# Patient Record
Sex: FEMALE | Race: WHITE | NOT HISPANIC OR LATINO | Employment: FULL TIME | URBAN - METROPOLITAN AREA
[De-identification: names, ages, dates, MRNs, and addresses within clinical notes are randomized per-mention and may not be internally consistent; named-entity substitution may affect disease eponyms.]

---

## 2019-04-06 ENCOUNTER — HOSPITAL ENCOUNTER (EMERGENCY)
Facility: HOSPITAL | Age: 29
Discharge: HOME | End: 2019-04-06
Attending: EMERGENCY MEDICINE
Payer: COMMERCIAL

## 2019-04-06 VITALS
TEMPERATURE: 98 F | RESPIRATION RATE: 18 BRPM | BODY MASS INDEX: 27.32 KG/M2 | DIASTOLIC BLOOD PRESSURE: 67 MMHG | SYSTOLIC BLOOD PRESSURE: 130 MMHG | WEIGHT: 170 LBS | HEIGHT: 66 IN | OXYGEN SATURATION: 98 % | HEART RATE: 114 BPM

## 2019-04-06 DIAGNOSIS — S93.402A SPRAIN OF LEFT ANKLE, UNSPECIFIED LIGAMENT, INITIAL ENCOUNTER: Primary | ICD-10-CM

## 2019-04-06 PROCEDURE — 99284 PR EMERGENCY DEPT VISIT,LEVEL IV: ICD-10-PCS | Mod: ,,, | Performed by: PHYSICIAN ASSISTANT

## 2019-04-06 PROCEDURE — 99284 EMERGENCY DEPT VISIT MOD MDM: CPT | Mod: 25

## 2019-04-06 PROCEDURE — 25000003 PHARM REV CODE 250: Performed by: PHYSICIAN ASSISTANT

## 2019-04-06 PROCEDURE — 96372 THER/PROPH/DIAG INJ SC/IM: CPT

## 2019-04-06 PROCEDURE — 63600175 PHARM REV CODE 636 W HCPCS: Performed by: PHYSICIAN ASSISTANT

## 2019-04-06 PROCEDURE — 99284 EMERGENCY DEPT VISIT MOD MDM: CPT | Mod: ,,, | Performed by: PHYSICIAN ASSISTANT

## 2019-04-06 RX ORDER — ACETAMINOPHEN 500 MG
1000 TABLET ORAL
Status: COMPLETED | OUTPATIENT
Start: 2019-04-06 | End: 2019-04-06

## 2019-04-06 RX ORDER — KETOROLAC TROMETHAMINE 30 MG/ML
15 INJECTION, SOLUTION INTRAMUSCULAR; INTRAVENOUS
Status: COMPLETED | OUTPATIENT
Start: 2019-04-06 | End: 2019-04-06

## 2019-04-06 RX ORDER — NAPROXEN 500 MG/1
500 TABLET ORAL 2 TIMES DAILY WITH MEALS
Qty: 30 TABLET | Refills: 0 | Status: SHIPPED | OUTPATIENT
Start: 2019-04-06

## 2019-04-06 RX ADMIN — ACETAMINOPHEN 1000 MG: 500 TABLET ORAL at 08:04

## 2019-04-06 RX ADMIN — KETOROLAC TROMETHAMINE 15 MG: 30 INJECTION, SOLUTION INTRAMUSCULAR at 08:04

## 2019-04-07 NOTE — ED TRIAGE NOTES
"Pt was dancing and "rolled" her ankle.  Hobbled to the bed at home.  Denies falling, denies any other pain.  No obvious deformity, cap refill <3, strong pedal pulse.  No discoloration.  "

## 2019-04-07 NOTE — ED PROVIDER NOTES
Encounter Date: 4/6/2019       History     Chief Complaint   Patient presents with    Foot Pain     reports twisting ankle to left foot tonight, unable to bear weight on foot     28-year-old female with no known past medical history presents for pain and swelling to the left ankle after twisting it while dancing 1 hr PTA.  She reports difficulty bearing weight afterwards.  Pain is worse with movement, denies any palliating factors, has not attempted any OTC medications.  Denies pain to any other joints, numbness/tingling/weakness.  Denies LOC or syncope.        Review of patient's allergies indicates:  No Known Allergies  History reviewed. No pertinent past medical history.  History reviewed. No pertinent surgical history.  History reviewed. No pertinent family history.  Social History     Tobacco Use    Smoking status: Current Every Day Smoker     Packs/day: 0.50     Types: Cigarettes    Smokeless tobacco: Never Used   Substance Use Topics    Alcohol use: Not on file    Drug use: Not Currently     Review of Systems   Constitutional: Negative for fatigue and fever.   Respiratory: Negative for cough and shortness of breath.    Cardiovascular: Negative for chest pain and palpitations.   Gastrointestinal: Negative for abdominal pain, diarrhea, nausea and vomiting.   Genitourinary: Negative for dysuria and hematuria.   Musculoskeletal: Positive for arthralgias, gait problem and joint swelling. Negative for myalgias, neck pain and neck stiffness.   Skin: Negative for pallor and wound.   Allergic/Immunologic: Negative for environmental allergies and immunocompromised state.   Neurological: Negative for weakness and numbness.   Hematological: Negative for adenopathy. Does not bruise/bleed easily.       Physical Exam     Initial Vitals [04/06/19 1955]   BP Pulse Resp Temp SpO2   130/67 (!) 114 18 98.4 °F (36.9 °C) 98 %      MAP       --         Physical Exam    Nursing note and vitals reviewed.  Constitutional: She  appears well-developed and well-nourished. She is not diaphoretic. No distress.   Family member at bedside   HENT:   Head: Normocephalic and atraumatic.   Neck: Normal range of motion. Neck supple.   Cardiovascular: Regular rhythm, normal heart sounds and intact distal pulses. Exam reveals no gallop and no friction rub.    No murmur heard.  Tachycardic   Pulmonary/Chest: Breath sounds normal.   Musculoskeletal:        Right knee: Normal.        Left knee: Normal.        Right ankle: Normal.        Left ankle: She exhibits swelling. She exhibits normal range of motion, no ecchymosis, no deformity, no laceration and normal pulse. Tenderness. Head of 5th metatarsal tenderness found. No lateral malleolus, no medial malleolus, no AITFL, no CF ligament, no posterior TFL and no proximal fibula tenderness found. Achilles tendon exhibits no pain, no defect and normal Prince's test results.        Right lower leg: Normal.        Left lower leg: Normal.        Right foot: Normal.        Left foot: There is tenderness and swelling. There is normal range of motion, no bony tenderness, normal capillary refill, no crepitus, no deformity and no laceration.   Neurological: She is alert and oriented to person, place, and time. She has normal strength. No sensory deficit.   Skin: Skin is warm and dry.   Psychiatric: She has a normal mood and affect.         ED Course   Procedures  Labs Reviewed - No data to display       Imaging Results          X-Ray Foot Complete Left (Final result)  Result time 04/06/19 21:29:55    Final result by Figueroa Carreon MD (04/06/19 21:29:55)                 Impression:      Mild soft tissue swelling about the ankle.  No acute bony abnormality.    Nonspecific round metallic foreign body adjacent to the 1st proximal phalanx.      Electronically signed by: Figueroa Carreon MD  Date:    04/06/2019  Time:    21:29             Narrative:    EXAMINATION:  XR ANKLE COMPLETE 3 VIEW LEFT; XR FOOT COMPLETE 3  VIEW LEFT    CLINICAL HISTORY:  Pain in unspecified ankle and joints of unspecified foot; Pain in unspecified foot.    TECHNIQUE:  Three views left ankle and three views left foot.    COMPARISON:  None.    FINDINGS:  No evidence of acute fracture or dislocation.  Mild soft tissue edema noted about the ankle.  There is a 4 mm well-circumscribed round radiopaque foreign body noted at the lateral aspect of the 1st proximal phalanx.                               X-Ray Ankle Complete Left (Final result)  Result time 04/06/19 21:29:55    Final result by Figueroa Carreon MD (04/06/19 21:29:55)                 Impression:      Mild soft tissue swelling about the ankle.  No acute bony abnormality.    Nonspecific round metallic foreign body adjacent to the 1st proximal phalanx.      Electronically signed by: Figueroa Carreon MD  Date:    04/06/2019  Time:    21:29             Narrative:    EXAMINATION:  XR ANKLE COMPLETE 3 VIEW LEFT; XR FOOT COMPLETE 3 VIEW LEFT    CLINICAL HISTORY:  Pain in unspecified ankle and joints of unspecified foot; Pain in unspecified foot.    TECHNIQUE:  Three views left ankle and three views left foot.    COMPARISON:  None.    FINDINGS:  No evidence of acute fracture or dislocation.  Mild soft tissue edema noted about the ankle.  There is a 4 mm well-circumscribed round radiopaque foreign body noted at the lateral aspect of the 1st proximal phalanx.                                 Medical Decision Making:   History:   Old Medical Records: I decided to obtain old medical records.  Clinical Tests:   Radiological Study: Ordered and Reviewed       APC / Resident Notes:   28-year-old female presenting for pain and swelling of the left ankle after twisting it while dancing.  She is mildly tachycardic with heart rate of 114, vitals otherwise normal. Left ankle is swollen, with bony tenderness over the head of the 5th metatarsal.  No tenderness of palpation bilateral malleoli.  Neurovascularly intact.  Differential diagnosis includes ankle sprain, metatarsal fracture, ankle fracture.  Will give Toradol, do x-rays and reassess.    X-rays without evidence of fracture.  Will place an Ace wrap and give crutches.  Will discharge with naproxen and instructions to RICE the ankle. Stressed the importance of follow-up, strict ED return precautions given.  Patient voiced understanding and is comfortable with discharge. I discussed this patient with my supervising physician.    Pat Mars PA-C           Attending Attestation:     Physician Attestation Statement for NP/PA:   I discussed this assessment and plan of this patient with the NP/PA, but I did not personally examine the patient. The face to face encounter was performed by the NP/PA.                     Clinical Impression:       ICD-10-CM ICD-9-CM   1. Sprain of left ankle, unspecified ligament, initial encounter S93.402A 845.00         Disposition:   Disposition: Discharged  Condition: Stable                        Pat Mars PA-C  04/06/19 [id number]       Rosa Buck MD  04/15/19 0623

## 2019-08-28 ENCOUNTER — HOSPITAL ENCOUNTER (EMERGENCY)
Facility: HOSPITAL | Age: 29
Discharge: HOME | End: 2019-08-28
Attending: EMERGENCY MEDICINE
Payer: COMMERCIAL

## 2019-08-28 VITALS
BODY MASS INDEX: 30.22 KG/M2 | OXYGEN SATURATION: 99 % | SYSTOLIC BLOOD PRESSURE: 125 MMHG | DIASTOLIC BLOOD PRESSURE: 81 MMHG | RESPIRATION RATE: 12 BRPM | HEIGHT: 66 IN | TEMPERATURE: 98 F | WEIGHT: 188 LBS | HEART RATE: 81 BPM
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
## 2019-08-28 PROCEDURE — 99283 EMERGENCY DEPT VISIT LOW MDM: CPT | Mod: ,,, | Performed by: EMERGENCY MEDICINE

## 2019-08-28 PROCEDURE — 99283 PR EMERGENCY DEPT VISIT,LEVEL III: ICD-10-PCS | Mod: ,,, | Performed by: EMERGENCY MEDICINE

## 2019-08-28 PROCEDURE — 99283 EMERGENCY DEPT VISIT LOW MDM: CPT

## 2019-08-28 RX ORDER — SULFAMETHOXAZOLE AND TRIMETHOPRIM 800; 160 MG/1; MG/1
1 TABLET ORAL 2 TIMES DAILY
Qty: 14 TABLET | Refills: 0 | Status: SHIPPED | OUTPATIENT
Start: 2019-08-28 | End: 2019-09-04

## 2019-08-28 RX ORDER — LEVOTHYROXINE SODIUM 25 UG/1
25 TABLET ORAL DAILY
COMMUNITY

## 2019-08-28 RX ORDER — SERTRALINE HYDROCHLORIDE 50 MG/1
150 TABLET, FILM COATED ORAL DAILY
COMMUNITY

## 2019-08-28 NOTE — ED NOTES
LOC: The patient is awake, alert and aware of environment with an appropriate affect, the patient is oriented x 3 and speaking appropriately.  APPEARANCE: Patient resting comfortably and in no acute distress, patient is clean and well groomed.  SKIN: The skin is warm and dry, color consistent with ethnicity. Small area to left calf appears to be an insect bite  MUSKULOSKELETAL: Patient moving all extremities well, no obvious swelling or deformities noted.  RESPIRATORY: Airway is open and patent, respirations are spontaneous, patient has a normal effort and rate, no accessory muscle use noted.   NEUROLOGIC: Eyes open spontaneously, behavior appropriate to situation, follows commands

## 2019-08-28 NOTE — ED PROVIDER NOTES
"Encounter Date: 8/28/2019    SCRIBE #1 NOTE: I, Ania Garcia, am scribing for, and in the presence of,  Dr. Miguel Leonardo. I have scribed the entire note.       History     Chief Complaint   Patient presents with    Insect Bite     possible insect bite to posterior left calf since Sunday with increased pain     9:22 AM    Ms. Sandoval is a 29 year old female with a past medical history of thyroid disease with complaints of an insect bite. The patient reports that she noticed the instect bite on 8/25/19 on the back of her left calf. She states that the bite is itchy and red. The patient also states that she had difficulty "putting pressure" on her left leg. She reports occasional alcohol use and cigarette use. The patient denies allergies to medications, pertinent family history, diabetes, fevers, and shortness of breath.     The history is provided by the patient and medical records.     Review of patient's allergies indicates:  No Known Allergies  Past Medical History:   Diagnosis Date    Thyroid disease      History reviewed. No pertinent surgical history.  Family History   Problem Relation Age of Onset    No Known Problems Mother     No Known Problems Father      Social History     Tobacco Use    Smoking status: Current Every Day Smoker     Packs/day: 0.50     Types: Cigarettes    Smokeless tobacco: Never Used   Substance Use Topics    Alcohol use: Yes    Drug use: Not Currently     Review of Systems   Constitutional: Negative for chills and fever.   HENT: Negative for congestion and sore throat.    Eyes: Negative for visual disturbance.   Respiratory: Negative for cough and shortness of breath.    Cardiovascular: Negative for chest pain.   Gastrointestinal: Negative for nausea and vomiting.   Genitourinary: Negative for dysuria and hematuria.   Musculoskeletal: Negative for back pain.   Skin: Negative for rash.        Insect bite on back of left calf.    Neurological: Negative for weakness and numbness. " "  Hematological: Does not bruise/bleed easily.       Physical Exam     Initial Vitals [08/28/19 0922]   BP Pulse Resp Temp SpO2   125/81 81 12 97.9 °F (36.6 °C) 99 %      MAP       --         Physical Exam    Nursing note and vitals reviewed.  Constitutional: She appears well-developed and well-nourished. She is not diaphoretic. No distress.   HENT:   Head: Normocephalic and atraumatic.   Nose: Nose normal.   Eyes: EOM are normal. Pupils are equal, round, and reactive to light.   Neck: Normal range of motion. Neck supple.   Cardiovascular: Normal rate and regular rhythm.   Pulmonary/Chest: Breath sounds normal. No respiratory distress. She has no wheezes. She has no rhonchi. She has no rales. She exhibits no tenderness.   Abdominal: Soft. Bowel sounds are normal. She exhibits no distension. There is no tenderness.   Musculoskeletal: Normal range of motion.   Neurological: She is alert and oriented to person, place, and time. She has normal strength.   Skin: Skin is warm and dry. No rash noted.   2cm insect bite mild induration with erythema. No fluctuance or indication of abscess or indication for I&D.    Psychiatric: She has a normal mood and affect. Her behavior is normal. Thought content normal.       Vitals:    08/28/19 0922   BP: 125/81   Pulse: 81   Resp: 12   Temp: 97.9 °F (36.6 °C)   SpO2: 99%   Weight: 85.3 kg (188 lb)   Height: 5' 6" (1.676 m)         ED Course   Procedures  Labs Reviewed - No data to display       Imaging Results    None          Medical Decision Making:   History:   Old Medical Records: I decided to obtain old medical records.  ED Management:  9:23 AM  We will discharge with antibiotics and follow up precautions. There is no indication for I&D at this time. The patient is comfortale with this plan. I spoke with the patient about using topical itching medications and to aboid steroid medicaitons. The patient asked for a work excuse which I will provide.             Scribe Attestation: "   Scribe #1: I performed the above scribed service and the documentation accurately describes the services I performed. I attest to the accuracy of the note.               Clinical Impression:       ICD-10-CM ICD-9-CM   1. Insect bite, initial encounter W57.XXXA 919.4     E906.4         Disposition:   Disposition: Discharged  Condition: Stable                        Miguel Leonardo MD  08/28/19 1109

## 2019-11-05 ENCOUNTER — HOSPITAL ENCOUNTER (EMERGENCY)
Facility: HOSPITAL | Age: 29
Discharge: HOME | End: 2019-11-05
Attending: EMERGENCY MEDICINE
Payer: COMMERCIAL

## 2019-11-05 VITALS
RESPIRATION RATE: 18 BRPM | WEIGHT: 189 LBS | DIASTOLIC BLOOD PRESSURE: 83 MMHG | BODY MASS INDEX: 30.37 KG/M2 | OXYGEN SATURATION: 98 % | HEIGHT: 66 IN | TEMPERATURE: 98 F | SYSTOLIC BLOOD PRESSURE: 132 MMHG | HEART RATE: 84 BPM

## 2019-11-05 DIAGNOSIS — L03.90 WOUND CELLULITIS: Primary | ICD-10-CM

## 2019-11-05 PROCEDURE — 99283 EMERGENCY DEPT VISIT LOW MDM: CPT

## 2019-11-05 PROCEDURE — 99284 PR EMERGENCY DEPT VISIT,LEVEL IV: ICD-10-PCS | Mod: ,,, | Performed by: EMERGENCY MEDICINE

## 2019-11-05 PROCEDURE — 99284 EMERGENCY DEPT VISIT MOD MDM: CPT | Mod: ,,, | Performed by: EMERGENCY MEDICINE

## 2019-11-05 RX ORDER — SULFAMETHOXAZOLE AND TRIMETHOPRIM 800; 160 MG/1; MG/1
1 TABLET ORAL 2 TIMES DAILY
Qty: 14 TABLET | Refills: 0 | Status: SHIPPED | OUTPATIENT
Start: 2019-11-05 | End: 2019-11-12

## 2019-11-06 NOTE — ED PROVIDER NOTES
Encounter Date: 11/5/2019       History     Chief Complaint   Patient presents with    Insect Bite     above L knee, noticed bite yest     Patient is a 29 year old female that presents to the ED with wound to L knee. States she noted it yesterday. No injury, trauma, or known insect bite. Complains of pain to the area that is a 4/10. No fever. Took ibuprofen at 1330.        Review of patient's allergies indicates:  No Known Allergies  Past Medical History:   Diagnosis Date    Thyroid disease      History reviewed. No pertinent surgical history.  Family History   Problem Relation Age of Onset    No Known Problems Mother     No Known Problems Father      Social History     Tobacco Use    Smoking status: Current Every Day Smoker     Packs/day: 0.50     Types: Cigarettes    Smokeless tobacco: Never Used   Substance Use Topics    Alcohol use: Yes     Comment: occas    Drug use: Yes     Types: Marijuana     Review of Systems   Constitutional: Negative for chills and fever.   HENT: Negative for sore throat.    Respiratory: Negative for shortness of breath.    Cardiovascular: Negative for chest pain.   Gastrointestinal: Negative for nausea.   Genitourinary: Negative for dysuria.   Musculoskeletal: Negative for back pain.   Skin: Positive for color change and wound. Negative for rash.   Neurological: Negative for weakness.   Hematological: Does not bruise/bleed easily.       Physical Exam     Initial Vitals [11/05/19 1834]   BP Pulse Resp Temp SpO2   132/83 84 18 98 °F (36.7 °C) 98 %      MAP       --         Physical Exam    Vitals reviewed.  Constitutional: She appears well-developed and well-nourished. She is not diaphoretic. No distress.   HENT:   Head: Normocephalic and atraumatic.   Nose: Nose normal.   Eyes: Conjunctivae and EOM are normal.   Neck: Normal range of motion.   Pulmonary/Chest: No respiratory distress.   Abdominal: She exhibits no distension.   Musculoskeletal: Normal range of motion.        Left  knee: She exhibits normal range of motion, no swelling, no effusion and no bony tenderness.   3 x 3 cm erythema noted to L knee. Scab noted.    Neurological: She is alert and oriented to person, place, and time. She has normal strength. No sensory deficit.   Skin: Skin is warm and dry. There is erythema. No pallor.   Psychiatric: She has a normal mood and affect. Thought content normal.         ED Course   Procedures  Labs Reviewed - No data to display       Imaging Results    None          Medical Decision Making:   History:   Old Medical Records: I decided to obtain old medical records.  Initial Assessment:   Patient is a 29 year old female that presents to the ED with wound to L knee.   Differential Diagnosis:   Includes but is not limited to but bite, local reaction, cellulitis, abscess. L knee ROM and strength intact. No joint involvement.   ED Management:  Localized erythema noted to L knee. Scab was unroofed. Scant pus expressed from wound.     I will treat patient for localized cellulitis from possible bug bite. Proper wound care instructions given. Rx TMP SMZ. Advise topical application of abx ointment. States that she has neosporin at home. Follow up with PCP. Return to ED precautions given.                                  Clinical Impression:       ICD-10-CM ICD-9-CM   1. Wound cellulitis L03.90 682.9         Disposition:   Disposition: Discharged  Condition: Stable                     Diana Mitchell PA-C  11/06/19 1200

## 2019-11-06 NOTE — ED TRIAGE NOTES
Patient states small red round area to left leg near knee, onset yesterday, painful to touch. Denies fevers. Ibuprofen 400mg at 1330

## 2019-11-06 NOTE — PROVIDER PROGRESS NOTES - EMERGENCY DEPT.
"Encounter Date: 11/5/2019    ED Physician Progress Notes           Medical screening exam completed.  I have conducted a focused provider triage encounter, findings are as follows:    Brief history of present illness:  Abscess since yesterday - worried about spider bite.  PCP told pt to come to ED    Vitals:    11/05/19 1834   BP: 132/83   Pulse: 84   Resp: 18   Temp: 98 °F (36.7 °C)   TempSrc: Oral   SpO2: 98%   Weight: 85.7 kg (189 lb)   Height: 5' 6" (1.676 m)       Pertinent physical exam:          Brief workup plan:  Pt will be seen in intake.  Probably better c de-gwendolyn, doubt need for I&D    Preliminary workup initiated; this workup will be continued and followed by the physician or advanced practice provider that is assigned to the patient when roomed.  "

## 2019-11-06 NOTE — DISCHARGE INSTRUCTIONS
Apply triple antibiotic ointment 2 times a day which is every 12 hr to area.  Keep wound clean and dry by changing dressing frequently.  Start taking oral antibiotic (Bactrim) 2 times a day which is every 12 hr.  Follow up with her primary care physician if your symptoms do not improve or worsen in 3 days or return to the emergency department.  No future appointments.    Our goal in the emergency department is to always give you outstanding care and exceptional service. You may receive a survey by mail or e-mail in the next week regarding your experience in our ED. We would greatly appreciate your completing and returning the survey. Your feedback provides us with a way to recognize our staff who give very good care and it helps us learn how to improve when your experience was below our aspiration of excellence.

## 2019-11-06 NOTE — ED NOTES
Patient identifiers verified and correct for Ms Bhatt  C/C: Small red area to left knee SEE NN  APPEARANCE: awake and alert in NAD.  SKIN: warm, dry,small red raised area to knee  MUSCULOSKELETAL: Patient moving all extremities spontaneously, no obvious swelling or deformities noted. Ambulates independently.  RESPIRATORY: Denies shortness of breath.Respirations unlabored.   CARDIAC: Denies CP, 2+ distal pulses; no peripheral edema  ABDOMEN: S/ND/NT, Denies nausea  : voids spontaneously, denies difficulty  Neurologic: AAO x 4; follows commands equal strength in all extremities; denies numbness/tingling. Denies dizziness Denies weakness